# Patient Record
Sex: FEMALE | Race: WHITE | NOT HISPANIC OR LATINO | Employment: OTHER | URBAN - METROPOLITAN AREA
[De-identification: names, ages, dates, MRNs, and addresses within clinical notes are randomized per-mention and may not be internally consistent; named-entity substitution may affect disease eponyms.]

---

## 2020-11-30 RX ORDER — LEVOTHYROXINE SODIUM 0.12 MG/1
TABLET ORAL
Qty: 90 TABLET | Refills: 0 | Status: SHIPPED | OUTPATIENT
Start: 2020-11-30 | End: 2021-04-05

## 2020-11-30 RX ORDER — ATORVASTATIN CALCIUM 20 MG/1
TABLET, FILM COATED ORAL
Qty: 90 TABLET | Refills: 0 | Status: SHIPPED | OUTPATIENT
Start: 2020-11-30 | End: 2021-06-03 | Stop reason: SDUPTHER

## 2020-11-30 RX ORDER — CALCITRIOL 0.25 UG/1
CAPSULE, LIQUID FILLED ORAL
Qty: 90 CAPSULE | Refills: 0 | Status: SHIPPED | OUTPATIENT
Start: 2020-11-30 | End: 2021-01-18

## 2020-11-30 NOTE — TELEPHONE ENCOUNTER
Pt called needing refills on Calcitriol 0.25 mcg, Levothyroxine 125 mcg, Atorvastatin 20 mg. Pt asked for a 90 day supply on all medicaitons. Pt last seen 11/14/19 pt has no upcoming appt scheduled.

## 2021-01-18 RX ORDER — CALCITRIOL 0.25 UG/1
CAPSULE, LIQUID FILLED ORAL
Qty: 90 CAPSULE | Refills: 0 | Status: SHIPPED | OUTPATIENT
Start: 2021-01-18 | End: 2021-01-20 | Stop reason: SDUPTHER

## 2021-01-20 RX ORDER — CALCITRIOL 0.25 UG/1
0.25 CAPSULE, LIQUID FILLED ORAL 3 TIMES DAILY
Qty: 90 CAPSULE | Refills: 1 | Status: SHIPPED | OUTPATIENT
Start: 2021-01-20 | End: 2021-05-11

## 2021-01-20 NOTE — TELEPHONE ENCOUNTER
PT CALLED STATING THAT THE DIRECTIONS OF HER CALCITRIOL WAS INCORRECT WHEN SHE PICKED IT UP LAST. SHE STATED THAT SHE IS ALMOST OUT AND WOULD LIKE A REFILL SENT IN TO [facility] IN [place].

## 2021-04-05 RX ORDER — LEVOTHYROXINE SODIUM 0.12 MG/1
TABLET ORAL
Qty: 90 TABLET | Refills: 0 | Status: SHIPPED | OUTPATIENT
Start: 2021-04-05 | End: 2021-06-03 | Stop reason: SDUPTHER

## 2021-05-11 ENCOUNTER — OFFICE VISIT (OUTPATIENT)
Dept: ENDOCRINOLOGY | Facility: CLINIC | Age: 61
End: 2021-05-11

## 2021-05-11 VITALS
BODY MASS INDEX: 27.02 KG/M2 | DIASTOLIC BLOOD PRESSURE: 80 MMHG | HEIGHT: 65 IN | SYSTOLIC BLOOD PRESSURE: 120 MMHG | WEIGHT: 162.2 LBS | TEMPERATURE: 96.9 F

## 2021-05-11 DIAGNOSIS — E83.51 HYPOCALCEMIA: ICD-10-CM

## 2021-05-11 DIAGNOSIS — E89.0 POSTOPERATIVE HYPOTHYROIDISM: Primary | ICD-10-CM

## 2021-05-11 PROCEDURE — 99214 OFFICE O/P EST MOD 30 MIN: CPT | Performed by: INTERNAL MEDICINE

## 2021-05-11 RX ORDER — CALCITRIOL 0.25 UG/1
0.5 CAPSULE, LIQUID FILLED ORAL DAILY
Qty: 90 CAPSULE | Refills: 1 | Status: SHIPPED | OUTPATIENT
Start: 2021-05-11 | End: 2021-05-11 | Stop reason: SDUPTHER

## 2021-05-11 RX ORDER — CALCITRIOL 0.25 UG/1
0.5 CAPSULE, LIQUID FILLED ORAL DAILY
Qty: 180 CAPSULE | Refills: 3 | Status: SHIPPED | OUTPATIENT
Start: 2021-05-11 | End: 2021-06-03 | Stop reason: SDUPTHER

## 2021-05-11 NOTE — PROGRESS NOTES
"     Office Note      Date: 2021  Patient Name: Faith Bang  MRN: [id number]  : 1960    Chief Complaint   Patient presents with   • Follow-up   • Hypothyroidism       History of Present Illness:   Faith Bang is a 60 y.o. female who presents for Follow-up and Hypothyroidism  .shE HAS HYPOTHYROIDISM FOLLOWING THYROIDECTOMY FOR BENIGN MNG BUT BECAME  HYPOPARATHYROID AS WELL  SHE IS ON CALCITRIOL AND LEVOTHYROXINE. AND IS HERE FOR REGULAR  FOLLOW UP.    REPORTS NO CHANGES TO HER MEDICAL HX.   SHE  THINKS SHE  HAD COVID EARLY  ON BUT IT WAS BEFORE THERE  WAS A TEST.    SHE HAS SINCE BEEN VACCINATED.    SHE HAS NO QUESTIONS OR PROBLEMS.  NO PARESTHESIAS.  NO THYROID SYMPTOMS       Subjective          Review of Systems:   Review of Systems   Constitutional: Negative for fatigue and unexpected weight change.   Endocrine: Negative for cold intolerance and heat intolerance.       The following portions of the patient's history were reviewed and updated as appropriate: allergies, current medications, past family history, past medical history, past social history, past surgical history and problem list.    Objective     Visit Vitals  /80   Temp 96.9 °F (36.1 °C) (Infrared)   Ht 165.1 cm (65\")   Wt 73.6 kg (162 lb 3.2 oz)   BMI 26.99 kg/m²       Labs:    CBC w/DIFF  No results found for: WBC, RBC, HGB, HCT, MCV, MCH, MCHC, RDW, RDWSD, MPV, PLT, NEUTRORELPCT, LYMPHORELPCT, MONORELPCT, EOSRELPCT, BASORELPCT, AUTOIGPER, NEUTROABS, LYMPHSABS, MONOSABS, EOSABS, BASOSABS, AUTOIGNUM, NRBC    T4  No results found for: FREET4    TSH  No results found for: TSHBASE     Physical Exam:  Physical Exam  Vitals reviewed.   Constitutional:       Appearance: Normal appearance.   Neck:      Comments: NO PALPABLE THYROID TISSUE  Lymphadenopathy:      Cervical: No cervical adenopathy.   Neurological:      Mental Status: She is alert and oriented to person, place, and time.   Psychiatric:         Mood and Affect: Mood normal. "         Thought Content: Thought content normal.         Judgment: Judgment normal.         Assessment / Plan      Assessment & Plan:  Problem List Items Addressed This Visit        Other    Hypocalcemia    Current Assessment & Plan     DUE TO HYPOPARATHYROIDISM  WILL CHECK LEVELS          Relevant Medications    calcitriol (ROCALTROL) 0.25 MCG capsule    Other Relevant Orders    Comprehensive Metabolic Panel    Postoperative hypothyroidism - Primary    Current Assessment & Plan     CLINICALLY EUTHYROID.TSH ORDERED  MED TO BE ADJUSTED BASED UPON RESULTS          Relevant Medications    levothyroxine (SYNTHROID, LEVOTHROID) 125 MCG tablet    Other Relevant Orders    TSH           Berny Oneal MD   05/11/2021

## 2021-05-12 LAB
ALBUMIN SERPL-MCNC: 4.1 G/DL (ref 3.8–4.8)
ALBUMIN/GLOB SERPL: 1.9 {RATIO} (ref 1.2–2.2)
ALP SERPL-CCNC: 29 IU/L (ref 39–117)
ALT SERPL-CCNC: 10 IU/L (ref 0–32)
AST SERPL-CCNC: 16 IU/L (ref 0–40)
BILIRUB SERPL-MCNC: <0.2 MG/DL (ref 0–1.2)
BUN SERPL-MCNC: 20 MG/DL (ref 8–27)
BUN/CREAT SERPL: 26 (ref 12–28)
CALCIUM SERPL-MCNC: 7.3 MG/DL (ref 8.7–10.3)
CHLORIDE SERPL-SCNC: 100 MMOL/L (ref 96–106)
CO2 SERPL-SCNC: 25 MMOL/L (ref 20–29)
CREAT SERPL-MCNC: 0.76 MG/DL (ref 0.57–1)
GLOBULIN SER CALC-MCNC: 2.2 G/DL (ref 1.5–4.5)
GLUCOSE SERPL-MCNC: 100 MG/DL (ref 65–99)
POTASSIUM SERPL-SCNC: 3.9 MMOL/L (ref 3.5–5.2)
PROT SERPL-MCNC: 6.3 G/DL (ref 6–8.5)
SODIUM SERPL-SCNC: 141 MMOL/L (ref 134–144)
TSH SERPL DL<=0.005 MIU/L-ACNC: 0.27 UIU/ML (ref 0.45–4.5)

## 2021-06-03 DIAGNOSIS — E83.51 HYPOCALCEMIA: ICD-10-CM

## 2021-06-03 RX ORDER — LEVOTHYROXINE SODIUM 0.12 MG/1
TABLET ORAL
Qty: 90 TABLET | Refills: 3 | Status: SHIPPED | OUTPATIENT
Start: 2021-06-03 | End: 2022-05-09 | Stop reason: SDUPTHER

## 2021-06-03 RX ORDER — CALCITRIOL 0.25 UG/1
0.5 CAPSULE, LIQUID FILLED ORAL DAILY
Qty: 180 CAPSULE | Refills: 3 | Status: SHIPPED | OUTPATIENT
Start: 2021-06-03 | End: 2022-05-06 | Stop reason: SDUPTHER

## 2021-06-03 RX ORDER — ATORVASTATIN CALCIUM 20 MG/1
TABLET, FILM COATED ORAL
Qty: 90 TABLET | Refills: 3 | Status: SHIPPED | OUTPATIENT
Start: 2021-06-03

## 2022-04-21 ENCOUNTER — TELEPHONE (OUTPATIENT)
Dept: FAMILY MEDICINE CLINIC | Facility: CLINIC | Age: 62
End: 2022-04-21

## 2022-04-22 ENCOUNTER — TELEMEDICINE (OUTPATIENT)
Dept: FAMILY MEDICINE CLINIC | Facility: CLINIC | Age: 62
End: 2022-04-22

## 2022-04-22 DIAGNOSIS — R11.2 NAUSEA AND VOMITING, UNSPECIFIED VOMITING TYPE: Primary | ICD-10-CM

## 2022-04-22 DIAGNOSIS — Z87.898 HISTORY OF CONFUSION: ICD-10-CM

## 2022-04-22 PROCEDURE — 99214 OFFICE O/P EST MOD 30 MIN: CPT | Performed by: PHYSICIAN ASSISTANT

## 2022-04-22 RX ORDER — ONDANSETRON 4 MG/1
4 TABLET, FILM COATED ORAL EVERY 8 HOURS PRN
Qty: 6 TABLET | Refills: 0 | Status: SHIPPED | OUTPATIENT
Start: 2022-04-22

## 2022-04-22 NOTE — PROGRESS NOTES
Chief Complaint  Hospital Follow Up Visit (Pt is here for a HFU. )    Subjective          Faith Bang presents to [facility]  Visit done through  telehealth visit with patient permission, unable to access video.  Patient is in today for follow-up on ER visit on March 30, 2022.  She was at a dinner party when all of a sudden had episode of confusion and just did not feel quite right.  She was taken to the emergency room.  She had negative CT scan and cardiac evaluation.  Labs showed minimal decrease to potassium at 3.4 and calcium level of 8.1. Glucose of 111 but was not fasting.  She states was already feeling better by the time she got to the emergency room.  Not had any return of symptoms since this evening.  Had been under more acute stress with passing of a loved one and felt that could have been related and had had a few drinks of wine that evening.  Patient denies any symptoms of dizziness or memory loss.  States acutely her and her  have had a little bit of a stomach bug that started yesterday with episode of vomiting  so was requesting some Zofran to use as needed for nausea.  She does not feel that she is dehydrated.  Denies any dysuria or change in frequency of urination.  Denies any acute fever.  No headaches. Has not vomited today.      Objective   Vital Signs:   There were no vitals taken for this visit.    There is no height or weight on file to calculate BMI.    Review of Systems   Constitutional: Negative for fatigue and fever.   HENT: Negative for congestion and sore throat.    Eyes: Negative for visual disturbance.   Respiratory: Negative for cough and shortness of breath.    Gastrointestinal: Negative for abdominal pain, diarrhea, nausea and vomiting.   Genitourinary: Negative for dysuria and frequency.   Neurological: Negative for dizziness, headache and memory problem.       Past History:  Medical History: has a past medical history of  Hypercholesterolemia, Hyperglycemia, Hypoparathyroidism (HCC), and Postoperative hypothyroidism.   Surgical History: has a past surgical history that includes Cataract extraction, bilateral; Cholecystectomy; and Thyroidectomy.   Family History: family history includes Heart disease in her brother and mother; Hypertension in her mother; Lung cancer in her father.   Social History: reports that she has been smoking cigarettes. She has a 30.00 pack-year smoking history. She has never used smokeless tobacco. She reports current alcohol use. She reports that she does not use drugs.      Current Outpatient Medications:   •  atorvastatin (LIPITOR) 20 MG tablet, Take 1 tablet daily, Disp: 90 tablet, Rfl: 3  •  calcitriol (ROCALTROL) 0.25 MCG capsule, Take 2 capsules by mouth Daily. Appointment needed for additional refills., Disp: 180 capsule, Rfl: 3  •  levothyroxine (SYNTHROID, LEVOTHROID) 125 MCG tablet, TAKE ONE TABLET BY MOUTH DAILY, Disp: 90 tablet, Rfl: 3  •  ondansetron (Zofran) 4 MG tablet, Take 1 tablet by mouth Every 8 (Eight) Hours As Needed for Nausea or Vomiting., Disp: 6 tablet, Rfl: 0  Allergies: Sulfamethoxazole and Trimethoprim    Physical Exam        Assessment and Plan   Diagnoses and all orders for this visit:    1. Nausea and vomiting, unspecified vomiting type (Primary)  Discussed with patient limitation of telehealth visit.  She states she feels like she is improving from this acute nausea and vomiting but has requested a few Zofran to use in case needed for nausea.  Encouraged good hydration and close monitoring of symptoms. Encouraged to monitor bp and pulse rate.   If symptoms not improving would recommend an office or ER evaluation if needed. Discussed  possible side effects of Zofran.  2. History of confusion  Patient denies any return of symptoms from that evening of ER visit. If any return of symptoms would recommend neurology evaluation or acute eval through ER if needed.    3.Hypokalemia  Recommend to recheck potassium and calcium. Patient states has upcoming appt with her endocrinologist and will get her labs checked there.   4. Hypocalcemia  Will keep f/up with endocrinology as directed.   Other orders  -     ondansetron (Zofran) 4 MG tablet; Take 1 tablet by mouth Every 8 (Eight) Hours As Needed for Nausea or Vomiting.  Dispense: 6 tablet; Refill: 0            Follow Up   No follow-ups on file.  Patient was given instructions and counseling regarding her condition or for health maintenance advice. Please see specific information pulled into the AVS if appropriate.     Che Keyes PA-C

## 2022-04-28 ENCOUNTER — TRANSCRIBE ORDERS (OUTPATIENT)
Dept: ADMINISTRATIVE | Facility: HOSPITAL | Age: 62
End: 2022-04-28

## 2022-04-28 DIAGNOSIS — R06.00 DYSPNEA, UNSPECIFIED TYPE: Primary | ICD-10-CM

## 2022-05-06 ENCOUNTER — OFFICE VISIT (OUTPATIENT)
Dept: ENDOCRINOLOGY | Facility: CLINIC | Age: 62
End: 2022-05-06

## 2022-05-06 ENCOUNTER — LAB (OUTPATIENT)
Dept: LAB | Facility: HOSPITAL | Age: 62
End: 2022-05-06

## 2022-05-06 VITALS
BODY MASS INDEX: 25.66 KG/M2 | HEIGHT: 65 IN | SYSTOLIC BLOOD PRESSURE: 118 MMHG | HEART RATE: 71 BPM | WEIGHT: 154 LBS | DIASTOLIC BLOOD PRESSURE: 79 MMHG | OXYGEN SATURATION: 96 %

## 2022-05-06 DIAGNOSIS — E89.0 POSTOPERATIVE HYPOTHYROIDISM: Primary | ICD-10-CM

## 2022-05-06 DIAGNOSIS — E83.51 HYPOCALCEMIA: ICD-10-CM

## 2022-05-06 PROCEDURE — 99214 OFFICE O/P EST MOD 30 MIN: CPT | Performed by: INTERNAL MEDICINE

## 2022-05-06 RX ORDER — CALCITRIOL 0.25 UG/1
0.5 CAPSULE, LIQUID FILLED ORAL DAILY
Qty: 180 CAPSULE | Refills: 3 | Status: SHIPPED | OUTPATIENT
Start: 2022-05-06

## 2022-05-06 NOTE — PROGRESS NOTES
"     Office Note      Date: 2022  Patient Name: Faith Bang  MRN: [id number]  : 1960    Chief Complaint   Patient presents with   • Hypothyroidism       History of Present Illness:   Faith Bang is a 61 y.o. female who presents for Hypothyroidism  and hypocalcemia.  She is here for annual follow up.  --------------------------------------------  In March, she was stressed and had an episode of altered mental status. She went to the ER   Her work up was negative.  .    Subjective      }.    Review of Systems:   Review of Systems   Constitutional: Negative.    HENT: Negative.    Eyes: Negative.    Respiratory: Negative.    Psychiatric/Behavioral: Positive for confusion.       The following portions of the patient's history were reviewed and updated as appropriate: allergies, current medications, past family history, past medical history, past social history, past surgical history and problem list.    Objective     Visit Vitals  /79   Pulse 71   Ht 165.1 cm (65\")   Wt 69.9 kg (154 lb)   SpO2 96%   BMI 25.63 kg/m²       Labs:    CBC w/DIFF  No results found for: WBC, RBC, HGB, HCT, MCV, MCH, MCHC, RDW, RDWSD, MPV, PLT, NEUTRORELPCT, LYMPHORELPCT, MONORELPCT, EOSRELPCT, BASORELPCT, AUTOIGPER, NEUTROABS, LYMPHSABS, MONOSABS, EOSABS, BASOSABS, AUTOIGNUM, NRBC    T4  No results found for: FREET4    TSH  No results found for: TSHBASE     Physical Exam:  Physical Exam  Vitals reviewed.   Constitutional:       Appearance: Normal appearance.   HENT:      Head: Atraumatic.   Eyes:      Extraocular Movements: Extraocular movements intact.   Neck:      Comments: No palpable thyroid tissue   Lymphadenopathy:      Cervical: No cervical adenopathy.   Neurological:      Mental Status: She is alert.      Comments: Negative chvostek's   Psychiatric:         Mood and Affect: Mood normal.         Behavior: Behavior normal.         Thought Content: Thought content normal.         Judgment: Judgment normal. "         Assessment / Plan      Assessment & Plan:  Problem List Items Addressed This Visit        Other    Hypocalcemia    Current Assessment & Plan     Calcium was 8.1 at the hospital. No need to repeat the levels. Same meds            Relevant Medications    calcitriol (ROCALTROL) 0.25 MCG capsule    Postoperative hypothyroidism - Primary    Current Assessment & Plan     Clinically euthyroid. Thyroid levels ordered. Medication to be adjusted accordingly.           Relevant Medications    levothyroxine (SYNTHROID, LEVOTHROID) 125 MCG tablet    Other Relevant Orders    TSH           Berny Oneal MD   05/06/2022

## 2022-05-07 LAB — TSH SERPL DL<=0.005 MIU/L-ACNC: 0.84 UIU/ML (ref 0.45–4.5)

## 2022-05-09 RX ORDER — LEVOTHYROXINE SODIUM 0.12 MG/1
TABLET ORAL
Qty: 90 TABLET | Refills: 3 | Status: SHIPPED | OUTPATIENT
Start: 2022-05-09

## 2023-05-05 ENCOUNTER — OFFICE VISIT (OUTPATIENT)
Dept: ENDOCRINOLOGY | Facility: CLINIC | Age: 63
End: 2023-05-05
Payer: MEDICARE

## 2023-05-05 VITALS
DIASTOLIC BLOOD PRESSURE: 74 MMHG | WEIGHT: 162 LBS | HEIGHT: 65 IN | HEART RATE: 72 BPM | SYSTOLIC BLOOD PRESSURE: 116 MMHG | BODY MASS INDEX: 26.99 KG/M2 | OXYGEN SATURATION: 95 %

## 2023-05-05 DIAGNOSIS — E83.51 HYPOCALCEMIA: ICD-10-CM

## 2023-05-05 DIAGNOSIS — E89.0 POSTOPERATIVE HYPOTHYROIDISM: Primary | ICD-10-CM

## 2023-05-05 PROCEDURE — 1160F RVW MEDS BY RX/DR IN RCRD: CPT | Performed by: INTERNAL MEDICINE

## 2023-05-05 PROCEDURE — 36415 COLL VENOUS BLD VENIPUNCTURE: CPT | Performed by: INTERNAL MEDICINE

## 2023-05-05 PROCEDURE — 1159F MED LIST DOCD IN RCRD: CPT | Performed by: INTERNAL MEDICINE

## 2023-05-05 PROCEDURE — 99214 OFFICE O/P EST MOD 30 MIN: CPT | Performed by: INTERNAL MEDICINE

## 2023-05-05 RX ORDER — CALCITRIOL 0.25 UG/1
0.5 CAPSULE, LIQUID FILLED ORAL DAILY
Qty: 180 CAPSULE | Refills: 3 | Status: SHIPPED | OUTPATIENT
Start: 2023-05-05

## 2023-05-05 NOTE — PROGRESS NOTES
"     Office Note      Date: 2023  Patient Name: Faith Bang  MRN: [id number]  : 1960    Chief Complaint   Patient presents with   • Hypothyroidism       History of Present Illness:   Faith Bang is a 62 y.o. female who presents for Hypothyroidism  . And low calcium   Current rx:  T4, calcium and calcitriol     Changes in history:none   Questions /problems: none    Subjective          Review of Systems:   Review of Systems   Constitutional: Negative for fatigue and unexpected weight change.   HENT: Negative for trouble swallowing.    Eyes: Negative for visual disturbance.   Endocrine: Negative for cold intolerance and heat intolerance.   Psychiatric/Behavioral: Negative for dysphoric mood and sleep disturbance. The patient is not nervous/anxious.        The following portions of the patient's history were reviewed and updated as appropriate: allergies, current medications, past family history, past medical history, past social history, past surgical history and problem list.    Objective     Visit Vitals  /74   Pulse 72   Ht 165.1 cm (65\")   Wt 73.5 kg (162 lb)   SpO2 95%   BMI 26.96 kg/m²           Physical Exam:  Physical Exam  Vitals reviewed.   Constitutional:       Appearance: Normal appearance. She is obese.   HENT:      Head: Normocephalic and atraumatic.   Eyes:      Extraocular Movements: Extraocular movements intact.   Neck:      Comments: No palpable thyroid tissue  Lymphadenopathy:      Cervical: No cervical adenopathy.   Neurological:      Mental Status: She is alert.   Psychiatric:         Mood and Affect: Mood normal.         Thought Content: Thought content normal.         Judgment: Judgment normal.         Assessment / Plan      Assessment & Plan:  Problem List Items Addressed This Visit        Other    Hypocalcemia    Current Assessment & Plan     stble          Relevant Medications    calcitriol (ROCALTROL) 0.25 MCG capsule    Other Relevant Orders    Comprehensive Metabolic " Panel    Postoperative hypothyroidism - Primary    Current Assessment & Plan     Stable  Clinically euthyroid. Thyroid levels ordered. Medication to be adjusted accordingly.         Relevant Medications    levothyroxine (SYNTHROID, LEVOTHROID) 125 MCG tablet    Other Relevant Orders    TSH        Berny Oneal MD   05/05/2023

## 2023-05-06 LAB
ALBUMIN SERPL-MCNC: 4.4 G/DL (ref 3.8–4.8)
ALBUMIN/GLOB SERPL: 2 {RATIO} (ref 1.2–2.2)
ALP SERPL-CCNC: 30 IU/L (ref 44–121)
ALT SERPL-CCNC: 10 IU/L (ref 0–32)
AST SERPL-CCNC: 16 IU/L (ref 0–40)
BILIRUB SERPL-MCNC: 0.2 MG/DL (ref 0–1.2)
BUN SERPL-MCNC: 15 MG/DL (ref 8–27)
BUN/CREAT SERPL: 20 (ref 12–28)
CALCIUM SERPL-MCNC: 7.7 MG/DL (ref 8.7–10.3)
CHLORIDE SERPL-SCNC: 99 MMOL/L (ref 96–106)
CO2 SERPL-SCNC: 26 MMOL/L (ref 20–29)
CREAT SERPL-MCNC: 0.74 MG/DL (ref 0.57–1)
EGFRCR SERPLBLD CKD-EPI 2021: 90 ML/MIN/1.73
GLOBULIN SER CALC-MCNC: 2.2 G/DL (ref 1.5–4.5)
GLUCOSE SERPL-MCNC: 85 MG/DL (ref 70–99)
POTASSIUM SERPL-SCNC: 4 MMOL/L (ref 3.5–5.2)
PROT SERPL-MCNC: 6.6 G/DL (ref 6–8.5)
SODIUM SERPL-SCNC: 142 MMOL/L (ref 134–144)
TSH SERPL DL<=0.005 MIU/L-ACNC: 1.05 UIU/ML (ref 0.45–4.5)

## 2023-05-08 RX ORDER — LEVOTHYROXINE SODIUM 0.12 MG/1
TABLET ORAL
Qty: 90 TABLET | Refills: 3 | Status: SHIPPED | OUTPATIENT
Start: 2023-05-08

## 2024-05-10 ENCOUNTER — OFFICE VISIT (OUTPATIENT)
Dept: ENDOCRINOLOGY | Facility: CLINIC | Age: 64
End: 2024-05-10
Payer: MEDICARE

## 2024-05-10 VITALS
HEART RATE: 66 BPM | OXYGEN SATURATION: 96 % | HEIGHT: 65 IN | SYSTOLIC BLOOD PRESSURE: 120 MMHG | DIASTOLIC BLOOD PRESSURE: 64 MMHG | WEIGHT: 157 LBS | BODY MASS INDEX: 26.16 KG/M2

## 2024-05-10 DIAGNOSIS — E89.0 POSTOPERATIVE HYPOTHYROIDISM: Primary | ICD-10-CM

## 2024-05-10 DIAGNOSIS — E83.51 HYPOCALCEMIA: ICD-10-CM

## 2024-05-10 PROCEDURE — 99213 OFFICE O/P EST LOW 20 MIN: CPT | Performed by: INTERNAL MEDICINE

## 2024-05-10 RX ORDER — CALCITRIOL 0.25 UG/1
0.5 CAPSULE, LIQUID FILLED ORAL DAILY
Qty: 180 CAPSULE | Refills: 3 | Status: SHIPPED | OUTPATIENT
Start: 2024-05-10

## 2024-05-10 RX ORDER — LEVOTHYROXINE SODIUM 0.12 MG/1
TABLET ORAL
Qty: 90 TABLET | Refills: 3 | Status: SHIPPED | OUTPATIENT
Start: 2024-05-10

## 2024-05-11 LAB
ALBUMIN SERPL-MCNC: 4.1 G/DL (ref 3.5–5.2)
ALBUMIN/GLOB SERPL: 1.8 G/DL
ALP SERPL-CCNC: 27 U/L (ref 39–117)
ALT SERPL-CCNC: 10 U/L (ref 1–33)
AST SERPL-CCNC: 13 U/L (ref 1–32)
BILIRUB SERPL-MCNC: 0.2 MG/DL (ref 0–1.2)
BUN SERPL-MCNC: 12 MG/DL (ref 8–23)
BUN/CREAT SERPL: 17.1 (ref 7–25)
CALCIUM SERPL-MCNC: 7.5 MG/DL (ref 8.6–10.5)
CHLORIDE SERPL-SCNC: 102 MMOL/L (ref 98–107)
CO2 SERPL-SCNC: 28 MMOL/L (ref 22–29)
CREAT SERPL-MCNC: 0.7 MG/DL (ref 0.57–1)
EGFRCR SERPLBLD CKD-EPI 2021: 96.1 ML/MIN/1.73
GLOBULIN SER CALC-MCNC: 2.3 GM/DL
GLUCOSE SERPL-MCNC: 97 MG/DL (ref 65–99)
POTASSIUM SERPL-SCNC: 4.1 MMOL/L (ref 3.5–5.2)
PROT SERPL-MCNC: 6.4 G/DL (ref 6–8.5)
SODIUM SERPL-SCNC: 143 MMOL/L (ref 136–145)
TSH SERPL DL<=0.005 MIU/L-ACNC: 0.43 UIU/ML (ref 0.27–4.2)

## 2024-05-13 ENCOUNTER — TELEPHONE (OUTPATIENT)
Dept: ENDOCRINOLOGY | Facility: CLINIC | Age: 64
End: 2024-05-13
Payer: MEDICARE

## 2024-05-13 NOTE — TELEPHONE ENCOUNTER
Patient called stated she received Dr. Oneal lab result note and she wanted to know how much she needs to increase her calcium by. Please advise.

## 2025-07-04 DIAGNOSIS — E89.0 POSTOPERATIVE HYPOTHYROIDISM: ICD-10-CM

## 2025-07-07 RX ORDER — LEVOTHYROXINE SODIUM 125 UG/1
125 TABLET ORAL DAILY
Qty: 90 TABLET | Refills: 3 | OUTPATIENT
Start: 2025-07-07

## 2025-07-08 DIAGNOSIS — E89.0 POSTOPERATIVE HYPOTHYROIDISM: ICD-10-CM

## 2025-07-08 RX ORDER — LEVOTHYROXINE SODIUM 125 UG/1
125 TABLET ORAL DAILY
Qty: 90 TABLET | Refills: 3 | OUTPATIENT
Start: 2025-07-08

## 2025-07-11 DIAGNOSIS — E83.51 HYPOCALCEMIA: ICD-10-CM

## 2025-07-11 DIAGNOSIS — E89.0 POSTOPERATIVE HYPOTHYROIDISM: ICD-10-CM

## 2025-07-11 RX ORDER — CALCITRIOL 0.25 UG/1
0.5 CAPSULE, LIQUID FILLED ORAL DAILY
Qty: 180 CAPSULE | Refills: 0 | Status: SHIPPED | OUTPATIENT
Start: 2025-07-11

## 2025-07-11 RX ORDER — LEVOTHYROXINE SODIUM 125 UG/1
TABLET ORAL
Qty: 90 TABLET | Refills: 0 | Status: SHIPPED | OUTPATIENT
Start: 2025-07-11

## 2025-07-11 RX ORDER — ATORVASTATIN CALCIUM 20 MG/1
TABLET, FILM COATED ORAL
Qty: 90 TABLET | Refills: 0 | Status: SHIPPED | OUTPATIENT
Start: 2025-07-11

## 2025-07-11 NOTE — TELEPHONE ENCOUNTER
Caller: Faith Bang    Relationship: Self    Best call back number: [phone]    Requested Prescriptions:   Requested Prescriptions     Pending Prescriptions Disp Refills    levothyroxine (SYNTHROID, LEVOTHROID) 125 MCG tablet 90 tablet 3     Sig: TAKE ONE TABLET BY MOUTH DAILY    calcitriol (ROCALTROL) 0.25 MCG capsule 180 capsule 3     Sig: Take 2 capsules by mouth Daily. Appointment needed for additional refills.    atorvastatin (LIPITOR) 20 MG tablet 90 tablet 3     Sig: Take 1 tablet daily        Pharmacy where request should be sent:    [facility]   Last office visit with prescribing clinician: 5/10/2024   Last telemedicine visit with prescribing clinician: Visit date not found   Next office visit with prescribing clinician: 10/30/2025     Additional details provided by patient:  PATIENT SCHEDULED FOR FOLLOW UP APPT IN OCT-SHE ONLY HAS AB OUT 3 PILLS LEFT    Does the patient have less than a 3 day supply:  [] Yes  [] No    Would you like a call back once the refill request has been completed: [] Yes [] No    If the office needs to give you a call back, can they leave a voicemail: [] Yes [] No    Pearl Seth Rep   07/11/25 08:51 EDT         DELETE AFTER READING TO PATIENT: “Thank you for sharing this information with me. I will send a message to the clinical team. Please allow 48 hours for the clinical staff to follow up on this request.”

## 2025-07-11 NOTE — TELEPHONE ENCOUNTER
Rx Refill Note  Requested Prescriptions     Pending Prescriptions Disp Refills    levothyroxine (SYNTHROID, LEVOTHROID) 125 MCG tablet 90 tablet 0     Sig: TAKE ONE TABLET BY MOUTH DAILY    calcitriol (ROCALTROL) 0.25 MCG capsule 180 capsule 0     Sig: Take 2 capsules by mouth Daily. Appointment needed for additional refills.    atorvastatin (LIPITOR) 20 MG tablet 90 tablet 0     Sig: Take 1 tablet daily      Last office visit with prescribing clinician: 5/10/2024     Next office visit with prescribing clinician: 10/30/2025       Gomez Graff MA  07/11/25, 11:24 EDT